# Patient Record
Sex: FEMALE | Race: ASIAN | NOT HISPANIC OR LATINO
[De-identification: names, ages, dates, MRNs, and addresses within clinical notes are randomized per-mention and may not be internally consistent; named-entity substitution may affect disease eponyms.]

---
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
## 2024-04-25 ENCOUNTER — APPOINTMENT (OUTPATIENT)
Dept: OTOLARYNGOLOGY | Facility: CLINIC | Age: 24
End: 2024-04-25

## 2024-06-06 ENCOUNTER — APPOINTMENT (OUTPATIENT)
Dept: OTOLARYNGOLOGY | Facility: CLINIC | Age: 24
End: 2024-06-06
Payer: COMMERCIAL

## 2024-06-06 ENCOUNTER — NON-APPOINTMENT (OUTPATIENT)
Age: 24
End: 2024-06-06

## 2024-06-06 ENCOUNTER — TRANSCRIPTION ENCOUNTER (OUTPATIENT)
Age: 24
End: 2024-06-06

## 2024-06-06 VITALS
WEIGHT: 150 LBS | SYSTOLIC BLOOD PRESSURE: 106 MMHG | TEMPERATURE: 98 F | DIASTOLIC BLOOD PRESSURE: 64 MMHG | BODY MASS INDEX: 22.73 KG/M2 | HEART RATE: 83 BPM | HEIGHT: 68 IN

## 2024-06-06 DIAGNOSIS — R59.0 LOCALIZED ENLARGED LYMPH NODES: ICD-10-CM

## 2024-06-06 PROCEDURE — 99204 OFFICE O/P NEW MOD 45 MIN: CPT

## 2024-06-06 NOTE — PHYSICAL EXAM
[Normal] : mucosa is normal [Midline] : trachea located in midline position [de-identified] : left level III bulky mobile nodes, no palpable other masses

## 2024-06-06 NOTE — DATA REVIEWED
[de-identified] : CBC with mildly elevated WBC in April with neutrophil elevation as well and low lymphs.

## 2024-06-06 NOTE — CONSULT LETTER
[Consult Letter:] : I had the pleasure of evaluating your patient, [unfilled]. [Please see my note below.] : Please see my note below. [Consult Closing:] : Thank you very much for allowing me to participate in the care of this patient.  If you have any questions, please do not hesitate to contact me. [Sincerely,] : Sincerely, [DrCarrillo  ___] : Dr. KOCH [Dear  ___] : Dear ~RANDALL, [FreeTextEntry2] : Xavier Hoang PA-C [facility] [FreeTextEntry3] : Debbie Joyner MD Otolaryngology and Cranial Base Surgery  Attending Physician- Department of Otolaryngology and Head & Neck Surgery  [facility] -[employer] at [facility] Office: [phone]  Fax: [phone]

## 2024-06-06 NOTE — END OF VISIT
[FreeTextEntry3] : I personally saw and examined Ms. NEVAEH BOB in detail this visit today. I personally reviewed the HPI, PMH, FH, SH, ROS and medications/allergies. I have spoken to YUMIKO Avendaño regarding the history and have personally determined the assessment and plan of care, and documented this myself. I was present and participated in all key portions of the encounter and all procedures noted above. I have made changes in the body of the note where appropriate.   Attesting Faculty: See Attending Signature Below

## 2024-06-06 NOTE — HISTORY OF PRESENT ILLNESS
[de-identified] : 23y/o female who came in complaining of enlarged lymph nodes on her neck and now has a large mass on the left side of her neck that started about 3 months ago. She had a US neck showing enlarged lymph nodes 3.3cm on the left. She had a CXR that was clear. She had blood work done which didn't show any significant findings. She has + NIGHT SWEATS. No fever or chills or weight loss. In April she tried a course of keflex for a week but did not get better. She has not been out of the country recently or been exposed to cats.

## 2024-06-07 ENCOUNTER — NON-APPOINTMENT (OUTPATIENT)
Age: 24
End: 2024-06-07

## 2024-06-07 LAB
ACE BLD-CCNC: 22 U/L
ALBUMIN SERPL ELPH-MCNC: 4.7 G/DL
ALP BLD-CCNC: 53 U/L
ALT SERPL-CCNC: 10 U/L
ANION GAP SERPL CALC-SCNC: 12 MMOL/L
APTT BLD: 35 SEC
AST SERPL-CCNC: 16 U/L
BASOPHILS # BLD AUTO: 0.03 K/UL
BASOPHILS NFR BLD AUTO: 0.6 %
BILIRUB SERPL-MCNC: 1 MG/DL
BUN SERPL-MCNC: 11 MG/DL
CALCIUM SERPL-MCNC: 9.8 MG/DL
CHLORIDE SERPL-SCNC: 104 MMOL/L
CO2 SERPL-SCNC: 25 MMOL/L
CREAT SERPL-MCNC: 0.95 MG/DL
CRP SERPL-MCNC: <3 MG/L
EBV EA AB SER IA-ACNC: <5 U/ML
EBV EA AB TITR SER IF: POSITIVE
EBV EA IGG SER QL IA: 578 U/ML
EBV EA IGG SER-ACNC: NEGATIVE
EBV EA IGM SER IA-ACNC: NEGATIVE
EBV PATRN SPEC IB-IMP: NORMAL
EBV VCA IGG SER IA-ACNC: 133 U/ML
EBV VCA IGM SER QL IA: <10 U/ML
EGFR: 87 ML/MIN/1.73M2
EOSINOPHIL # BLD AUTO: 0.07 K/UL
EOSINOPHIL NFR BLD AUTO: 1.5 %
EPSTEIN-BARR VIRUS CAPSID ANTIGEN IGG: POSITIVE
ERYTHROCYTE [SEDIMENTATION RATE] IN BLOOD BY WESTERGREN METHOD: 7 MM/HR
GLUCOSE SERPL-MCNC: 76 MG/DL
HCT VFR BLD CALC: 38.1 %
HGB BLD-MCNC: 12.5 G/DL
HIV1+2 AB SPEC QL IA.RAPID: NONREACTIVE
IMM GRANULOCYTES NFR BLD AUTO: 0.2 %
INR PPP: 1.04 RATIO
LDH SERPL-CCNC: 161 U/L
LYMPHOCYTES # BLD AUTO: 1.64 K/UL
LYMPHOCYTES NFR BLD AUTO: 35.4 %
MAN DIFF?: NORMAL
MCHC RBC-ENTMCNC: 29.1 PG
MCHC RBC-ENTMCNC: 32.8 GM/DL
MCV RBC AUTO: 88.6 FL
MONOCYTES # BLD AUTO: 0.55 K/UL
MONOCYTES NFR BLD AUTO: 11.9 %
NEUTROPHILS # BLD AUTO: 2.33 K/UL
NEUTROPHILS NFR BLD AUTO: 50.4 %
PLATELET # BLD AUTO: 248 K/UL
POTASSIUM SERPL-SCNC: 4.5 MMOL/L
PROT SERPL-MCNC: 7.8 G/DL
PT BLD: 11.7 SEC
RBC # BLD: 4.3 M/UL
RBC # FLD: 13.8 %
SODIUM SERPL-SCNC: 141 MMOL/L
T PALLIDUM AB SER QL IA: NEGATIVE
URATE SERPL-MCNC: 4.7 MG/DL
WBC # FLD AUTO: 4.63 K/UL

## 2024-06-10 ENCOUNTER — NON-APPOINTMENT (OUTPATIENT)
Age: 24
End: 2024-06-10

## 2024-06-10 LAB
ANA SER IF-ACNC: NEGATIVE
M TB IFN-G BLD-IMP: NEGATIVE
QUANTIFERON TB PLUS MITOGEN MINUS NIL: >10 IU/ML
QUANTIFERON TB PLUS NIL: 0.12 IU/ML
QUANTIFERON TB PLUS TB1 MINUS NIL: 0.01 IU/ML
QUANTIFERON TB PLUS TB2 MINUS NIL: 0.02 IU/ML

## 2024-06-11 ENCOUNTER — RESULT REVIEW (OUTPATIENT)
Age: 24
End: 2024-06-11

## 2024-06-18 ENCOUNTER — APPOINTMENT (OUTPATIENT)
Dept: ULTRASOUND IMAGING | Facility: IMAGING CENTER | Age: 24
End: 2024-06-18
Payer: COMMERCIAL

## 2024-06-18 ENCOUNTER — OUTPATIENT (OUTPATIENT)
Dept: OUTPATIENT SERVICES | Facility: HOSPITAL | Age: 24
LOS: 1 days | End: 2024-06-18
Payer: COMMERCIAL

## 2024-06-18 ENCOUNTER — RESULT REVIEW (OUTPATIENT)
Age: 24
End: 2024-06-18

## 2024-06-18 DIAGNOSIS — R59.0 LOCALIZED ENLARGED LYMPH NODES: ICD-10-CM

## 2024-06-18 PROCEDURE — 88172 CYTP DX EVAL FNA 1ST EA SITE: CPT

## 2024-06-18 PROCEDURE — 76942 ECHO GUIDE FOR BIOPSY: CPT

## 2024-06-18 PROCEDURE — 88173 CYTOPATH EVAL FNA REPORT: CPT | Mod: 26

## 2024-06-18 PROCEDURE — 88365 INSITU HYBRIDIZATION (FISH): CPT | Mod: 26

## 2024-06-18 PROCEDURE — 88312 SPECIAL STAINS GROUP 1: CPT

## 2024-06-18 PROCEDURE — 88189 FLOWCYTOMETRY/READ 16 & >: CPT

## 2024-06-18 PROCEDURE — 88305 TISSUE EXAM BY PATHOLOGIST: CPT | Mod: 26

## 2024-06-18 PROCEDURE — 88365 INSITU HYBRIDIZATION (FISH): CPT

## 2024-06-18 PROCEDURE — 88185 FLOWCYTOMETRY/TC ADD-ON: CPT

## 2024-06-18 PROCEDURE — 88342 IMHCHEM/IMCYTCHM 1ST ANTB: CPT | Mod: 26,59

## 2024-06-18 PROCEDURE — 38505 NEEDLE BIOPSY LYMPH NODES: CPT | Mod: LT

## 2024-06-18 PROCEDURE — 88312 SPECIAL STAINS GROUP 1: CPT | Mod: 26

## 2024-06-18 PROCEDURE — 88341 IMHCHEM/IMCYTCHM EA ADD ANTB: CPT

## 2024-06-18 PROCEDURE — 38505 NEEDLE BIOPSY LYMPH NODES: CPT

## 2024-06-18 PROCEDURE — 87205 SMEAR GRAM STAIN: CPT

## 2024-06-18 PROCEDURE — 88184 FLOWCYTOMETRY/ TC 1 MARKER: CPT

## 2024-06-18 PROCEDURE — 88305 TISSUE EXAM BY PATHOLOGIST: CPT

## 2024-06-18 PROCEDURE — 88342 IMHCHEM/IMCYTCHM 1ST ANTB: CPT

## 2024-06-18 PROCEDURE — 88173 CYTOPATH EVAL FNA REPORT: CPT

## 2024-06-18 PROCEDURE — 88360 TUMOR IMMUNOHISTOCHEM/MANUAL: CPT

## 2024-06-18 PROCEDURE — 76942 ECHO GUIDE FOR BIOPSY: CPT | Mod: 26

## 2024-06-18 PROCEDURE — 88341 IMHCHEM/IMCYTCHM EA ADD ANTB: CPT | Mod: 26,59

## 2024-07-12 ENCOUNTER — APPOINTMENT (OUTPATIENT)
Dept: RHEUMATOLOGY | Facility: CLINIC | Age: 24
End: 2024-07-12
Payer: COMMERCIAL

## 2024-07-12 VITALS
HEART RATE: 81 BPM | WEIGHT: 150 LBS | HEIGHT: 68 IN | DIASTOLIC BLOOD PRESSURE: 70 MMHG | OXYGEN SATURATION: 98 % | SYSTOLIC BLOOD PRESSURE: 101 MMHG | BODY MASS INDEX: 22.73 KG/M2

## 2024-07-12 DIAGNOSIS — R59.0 LOCALIZED ENLARGED LYMPH NODES: ICD-10-CM

## 2024-07-12 PROCEDURE — 99204 OFFICE O/P NEW MOD 45 MIN: CPT

## 2024-07-15 ENCOUNTER — NON-APPOINTMENT (OUTPATIENT)
Age: 24
End: 2024-07-15

## 2024-07-15 LAB
ALP BLD-CCNC: 56 U/L
ALT SERPL-CCNC: 15 U/L
ANION GAP SERPL CALC-SCNC: 11 MMOL/L
APPEARANCE: CLEAR
AST SERPL-CCNC: 28 U/L
BACTERIA: NEGATIVE /HPF
BASOPHILS # BLD AUTO: 0.04 K/UL
BASOPHILS NFR BLD AUTO: 0.7 %
BILIRUB SERPL-MCNC: 0.8 MG/DL
BILIRUBIN URINE: NEGATIVE
BLOOD URINE: NEGATIVE
BUN SERPL-MCNC: 10 MG/DL
C3 SERPL-MCNC: 132 MG/DL
C4 SERPL-MCNC: 29 MG/DL
CAST: 0 /LPF
CD16+CD56+ CELLS # BLD: 138 CELLS/UL
CD19 CELLS NFR BLD: 258 CELLS/UL
CD3 CELLS NFR BLD: 76 %
CD3+CD4+ CELLS # BLD: 640 CELLS/UL
CD3+CD4+ CELLS NFR BLD: 37 %
CD3+CD4+ CELLS/CD3+CD8+ CLL SPEC: 1.19 RATIO
CD3+CD8+ CELLS # SPEC: 537 CELLS/UL
CD3+CD8+ CELLS NFR BLD: 31 %
CELLS.CD3-CD19+/CELLS IN BLOOD: 14 %
CHLORIDE SERPL-SCNC: 103 MMOL/L
CO2 SERPL-SCNC: 25 MMOL/L
COLOR: YELLOW
CREAT SERPL-MCNC: 0.9 MG/DL
CREAT SPEC-SCNC: 50 MG/DL
CREAT/PROT UR: 0.1 RATIO
EGFR: 93 ML/MIN/1.73M2
ENA SS-A AB SER IA-ACNC: <0.2 AL
ENA SS-B AB SER IA-ACNC: <0.2 AL
EOSINOPHIL # BLD AUTO: 0.11 K/UL
EOSINOPHIL NFR BLD AUTO: 1.8 %
EPITHELIAL CELLS: 4 /HPF
GLUCOSE QUALITATIVE U: NEGATIVE MG/DL
GLUCOSE SERPL-MCNC: 84 MG/DL
IGA SER QL IEP: 380 MG/DL
IGG SER QL IEP: 1472 MG/DL
IGM SER QL IEP: 100 MG/DL
IMM GRANULOCYTES NFR BLD AUTO: 0.2 %
KAPPA LC CSF-MCNC: 1.52 MG/DL
KAPPA LC SERPL-MCNC: 1.97 MG/DL
KETONES URINE: NEGATIVE MG/DL
LEUKOCYTE ESTERASE URINE: ABNORMAL
LYMPHOCYTES # BLD AUTO: 1.72 K/UL
LYMPHOCYTES NFR BLD AUTO: 28.3 %
MAN DIFF?: NORMAL
MCHC RBC-ENTMCNC: 29.5 PG
MCHC RBC-ENTMCNC: 31.5 GM/DL
MCV RBC AUTO: 93.9 FL
MICROSCOPIC-UA: NORMAL
MONOCYTES # BLD AUTO: 0.56 K/UL
MONOCYTES NFR BLD AUTO: 9.2 %
NEUTROPHILS # BLD AUTO: 3.64 K/UL
NITRITE URINE: NEGATIVE
PH URINE: 6.5
PLATELET # BLD AUTO: 277 K/UL
POTASSIUM SERPL-SCNC: 4.9 MMOL/L
PROT SERPL-MCNC: 8.3 G/DL
PROT UR-MCNC: 4 MG/DL
PROTEIN URINE: NEGATIVE MG/DL
RBC # BLD: 4.57 M/UL
RBC # FLD: 13.8 %
RED BLOOD CELLS URINE: 0 /HPF
REVIEW: NORMAL
SODIUM SERPL-SCNC: 138 MMOL/L
SPECIFIC GRAVITY URINE: 1.01
UROBILINOGEN URINE: 0.2 MG/DL
WBC # FLD AUTO: 6.08 K/UL
WHITE BLOOD CELLS URINE: 1 /HPF

## 2024-07-17 LAB — M PROTEIN SPEC IFE-MCNC: NORMAL

## 2024-07-22 ENCOUNTER — APPOINTMENT (OUTPATIENT)
Dept: CT IMAGING | Facility: CLINIC | Age: 24
End: 2024-07-22
Payer: COMMERCIAL

## 2024-07-22 PROCEDURE — 74177 CT ABD & PELVIS W/CONTRAST: CPT | Mod: 26

## 2024-07-22 PROCEDURE — 71260 CT THORAX DX C+: CPT | Mod: 26

## 2024-08-09 ENCOUNTER — OUTPATIENT (OUTPATIENT)
Dept: OUTPATIENT SERVICES | Facility: HOSPITAL | Age: 24
LOS: 1 days | Discharge: HOME | End: 2024-08-09

## 2024-08-09 DIAGNOSIS — R59.9 ENLARGED LYMPH NODES, UNSPECIFIED: ICD-10-CM

## 2024-08-15 ENCOUNTER — APPOINTMENT (OUTPATIENT)
Dept: HEMATOLOGY ONCOLOGY | Facility: CLINIC | Age: 24
End: 2024-08-15

## 2024-08-15 ENCOUNTER — APPOINTMENT (OUTPATIENT)
Dept: HEMATOLOGY ONCOLOGY | Facility: CLINIC | Age: 24
End: 2024-08-15
Payer: COMMERCIAL

## 2024-08-15 ENCOUNTER — RESULT REVIEW (OUTPATIENT)
Age: 24
End: 2024-08-15

## 2024-08-15 VITALS
TEMPERATURE: 98.1 F | BODY MASS INDEX: 23.12 KG/M2 | HEIGHT: 68.78 IN | SYSTOLIC BLOOD PRESSURE: 110 MMHG | WEIGHT: 156.07 LBS | HEART RATE: 69 BPM | DIASTOLIC BLOOD PRESSURE: 75 MMHG | RESPIRATION RATE: 16 BRPM | OXYGEN SATURATION: 97 %

## 2024-08-15 DIAGNOSIS — R59.0 LOCALIZED ENLARGED LYMPH NODES: ICD-10-CM

## 2024-08-15 DIAGNOSIS — Z82.49 FAMILY HISTORY OF ISCHEMIC HEART DISEASE AND OTHER DISEASES OF THE CIRCULATORY SYSTEM: ICD-10-CM

## 2024-08-15 DIAGNOSIS — Z83.3 FAMILY HISTORY OF DIABETES MELLITUS: ICD-10-CM

## 2024-08-15 LAB
BASOPHILS # BLD AUTO: 0.04 K/UL — SIGNIFICANT CHANGE UP (ref 0–0.2)
BASOPHILS NFR BLD AUTO: 0.7 % — SIGNIFICANT CHANGE UP (ref 0–2)
EOSINOPHIL # BLD AUTO: 0.05 K/UL — SIGNIFICANT CHANGE UP (ref 0–0.5)
EOSINOPHIL NFR BLD AUTO: 0.9 % — SIGNIFICANT CHANGE UP (ref 0–6)
HCT VFR BLD CALC: 38.1 % — SIGNIFICANT CHANGE UP (ref 34.5–45)
HGB BLD-MCNC: 12.9 G/DL — SIGNIFICANT CHANGE UP (ref 11.5–15.5)
IMM GRANULOCYTES NFR BLD AUTO: 0.2 % — SIGNIFICANT CHANGE UP (ref 0–0.9)
LYMPHOCYTES # BLD AUTO: 1.99 K/UL — SIGNIFICANT CHANGE UP (ref 1–3.3)
LYMPHOCYTES # BLD AUTO: 35.8 % — SIGNIFICANT CHANGE UP (ref 13–44)
MCHC RBC-ENTMCNC: 29.8 PG — SIGNIFICANT CHANGE UP (ref 27–34)
MCHC RBC-ENTMCNC: 33.9 G/DL — SIGNIFICANT CHANGE UP (ref 32–36)
MCV RBC AUTO: 88 FL — SIGNIFICANT CHANGE UP (ref 80–100)
MONOCYTES # BLD AUTO: 0.58 K/UL — SIGNIFICANT CHANGE UP (ref 0–0.9)
MONOCYTES NFR BLD AUTO: 10.4 % — SIGNIFICANT CHANGE UP (ref 2–14)
NEUTROPHILS # BLD AUTO: 2.89 K/UL — SIGNIFICANT CHANGE UP (ref 1.8–7.4)
NEUTROPHILS NFR BLD AUTO: 52 % — SIGNIFICANT CHANGE UP (ref 43–77)
NRBC # BLD: 0 /100 WBCS — SIGNIFICANT CHANGE UP (ref 0–0)
NRBC BLD-RTO: 0 /100 WBCS — SIGNIFICANT CHANGE UP (ref 0–0)
PLATELET # BLD AUTO: 227 K/UL — SIGNIFICANT CHANGE UP (ref 150–400)
RBC # BLD: 4.33 M/UL — SIGNIFICANT CHANGE UP (ref 3.8–5.2)
RBC # FLD: 12.9 % — SIGNIFICANT CHANGE UP (ref 10.3–14.5)
WBC # BLD: 5.56 K/UL — SIGNIFICANT CHANGE UP (ref 3.8–10.5)
WBC # FLD AUTO: 5.56 K/UL — SIGNIFICANT CHANGE UP (ref 3.8–10.5)

## 2024-08-15 PROCEDURE — 99204 OFFICE O/P NEW MOD 45 MIN: CPT

## 2024-08-16 LAB
ALBUMIN SERPL ELPH-MCNC: 4.5 G/DL
ALP BLD-CCNC: 59 U/L
ALT SERPL-CCNC: 9 U/L
ANION GAP SERPL CALC-SCNC: 14 MMOL/L
AST SERPL-CCNC: 15 U/L
B2 MICROGLOB SERPL-MCNC: 1.9 MG/L
BILIRUB SERPL-MCNC: 0.7 MG/DL
BUN SERPL-MCNC: 9 MG/DL
CALCIUM SERPL-MCNC: 9.4 MG/DL
CHLORIDE SERPL-SCNC: 100 MMOL/L
CO2 SERPL-SCNC: 23 MMOL/L
CREAT SERPL-MCNC: 0.79 MG/DL
EGFR: 108 ML/MIN/1.73M2
GLUCOSE SERPL-MCNC: 82 MG/DL
LDH SERPL-CCNC: 189 U/L
POTASSIUM SERPL-SCNC: 4.4 MMOL/L
PROT SERPL-MCNC: 7.4 G/DL
SODIUM SERPL-SCNC: 137 MMOL/L

## 2024-08-16 NOTE — RESULTS/DATA
[FreeTextEntry1] : US neck 6/18/24 Initial neck ultrasound demonstrates 2.6 x 0.5 cm prominent left posterior cervical lymph node. The lymph node demonstrates prominent cortex. This lymph node was selected for biopsy. Specimen sent for cytology, histology and flow cytometry evaluation at [facility] integrated pathology services.  Patient:     NEVAEH BOB Accession:                             [id number] Collected Date/Time:                   6/18/2024 19:28 EDT Received Date/Time:                    6/18/2024 19:28 EDT Fine Needle Aspiration Addendum Report - Auth (Verified) Addendum An addendum is issued to report additional stains Additional stains TOOTIE, AFB, GMS are performed on block 1B TOOTIE is negative. AFB- Negative for acid fact bacilli GMS- negative for fungal organisms Diagnosis remains unchanged  Verified by: Susmitha Edappallath, MD (Electronic Signature) Reported on: 07/23/24 13:41 EDT, [facility] _________________________________________________________________  Fine Needle Aspiration Report - Auth (Verified) Specimen(s) Submitted LYMPH NODE, CERVICAL, LEVEL 5, LEFT, US GUIDED CORE BIOPSY AND FNA  Final Diagnosis LYMPH NODE, CERVICAL, LEVEL 5, LEFT, US GUIDED CORE BIOPSY AND FNA NEGATIVE FOR CARCINOMA. Paracortical hyperplasia with patchy necrosis See note:  Note: Current biopsy of the left level 5 cervical lymph node shows paracortical hyperplasia with increased histiocytes and  patchy necrosis. Differential diagnosis include Kikuchi's lymphadenitis, lupus lymphadenitis among others. Suggest clinical correlation and follow up. If lymphadenopathy persists, excision biopsy is recommend with flow cytometry studies.  Microscopy: The cytology slides, cell block show a polymorphous population of lymphocytes, macrophages and few plasma cells.  Core biopsy sections  show expanded paracortex with patchy areas with increased histiocytes, some with crescentic nuclei, aggregates of plasmacytoid cells, few immunoblasts, few plasma cells and numerous karryorhectic bodies and patchy necrosis  Immunohistochemical  stains  for CD2, CD3, CD4, CD5, CD7, CD8, CD20, PAX5, CD10, BCL6, BCL2, CyclinD1, Ki67, CD23, CD21, CD43, CD79a, MUM1, MNDA, LEF1,  myeloperoxidase , , S100, CD1a, TOOTIE stains performed on formalin fixed paraffin embedded tissue, block 1B.  CD2, CD3, CD5, CD43, CD4, CD8 highlight T-cells and expanded paracortex. CD8 appears to be more than CD4. Myeloperoxidase shows some background staining and appear to stain few histiocytes.  highlight few clusters of plasmacytoid dendritic cells. S100 stains few dendritic cells. CD1a is negative.  Ki-67 demonstrates a proliferative rate of around 10-20%  CD20, PAX5, CD79a highlight the B-cells. CD3, CD5, CD43, LEF1 highlight T-cells. CD10, BCL6 is essentially negative. BCL2 highlight B and T-cells. Cyclin D1 stains few histiocytes. CD21/CD23 highlight few disrupted follicular dendritic meshwork.  Special stains AFB, GMS are pending and will be reported as an addendum  Flow  Cytometry ([id number]): - The lymphocyte  immunophenotypic  findings show no diagnostic abnormalities  Screened by: [facility] CT(ASCP) Verified by: Susmitha Edappallath, MD ______________________________________________________________________________________________  PROCEDURE - 7/22/24 CT of the Chest, Abdomen and Pelvis was performed. Sagittal and coronal reformats were performed.  FINDINGS: CHEST: LUNGS AND LARGE AIRWAYS: Patent central airways. No pulmonary nodules. PLEURA: No pleural effusion. VESSELS: Within normal limits. HEART: Heart size is normal. No pericardial effusion. MEDIASTINUM AND NICK: No lymphadenopathy. CHEST WALL AND LOWER NECK: Within normal limits.  ABDOMEN AND PELVIS: LIVER: Within normal limits. BILE DUCTS: Normal caliber. GALLBLADDER: Within normal limits. SPLEEN: Within normal limits. PANCREAS: Within normal limits. ADRENALS: Within normal limits. KIDNEYS/URETERS: Within normal limits.  BLADDER: Within normal limits. REPRODUCTIVE ORGANS: 2.9 cm hemorrhagic cyst in the right adnexa.  BOWEL: No bowel obstruction. Appendix is not visualized. No evidence of inflammation in the pericecal region. PERITONEUM/RETROPERITONEUM: Within normal limits. VESSELS: Within normal limits. LYMPH NODES: No lymphadenopathy. ABDOMINAL WALL: Within normal limits. BONES: No suspicious lytic or blastic lesion. L5 superior endplate Schmorl's node.  IMPRESSION:  No evidence of thoracic, abdominal or pelvic lymphadenopathy.

## 2024-08-16 NOTE — RESULTS/DATA
[FreeTextEntry1] : US neck 6/18/24 Initial neck ultrasound demonstrates 2.6 x 0.5 cm prominent left posterior cervical lymph node. The lymph node demonstrates prominent cortex. This lymph node was selected for biopsy. Specimen sent for cytology, histology and flow cytometry evaluation at [facility] integrated pathology services.  Patient:     NEVAEH BOB Accession:                             [id number] Collected Date/Time:                   6/18/2024 19:28 EDT Received Date/Time:                    6/18/2024 19:28 EDT Fine Needle Aspiration Addendum Report - Auth (Verified) Addendum An addendum is issued to report additional stains Additional stains TOOTIE, AFB, GMS are performed on block 1B TOOTIE is negative. AFB- Negative for acid fact bacilli GMS- negative for fungal organisms Diagnosis remains unchanged  Verified by: Susmitha Edappallath, MD (Electronic Signature) Reported on: 07/23/24 13:41 EDT, [facility] _________________________________________________________________  Fine Needle Aspiration Report - Auth (Verified) Specimen(s) Submitted LYMPH NODE, CERVICAL, LEVEL 5, LEFT, US GUIDED CORE BIOPSY AND FNA  Final Diagnosis LYMPH NODE, CERVICAL, LEVEL 5, LEFT, US GUIDED CORE BIOPSY AND FNA NEGATIVE FOR CARCINOMA. Paracortical hyperplasia with patchy necrosis See note:  Note: Current biopsy of the left level 5 cervical lymph node shows paracortical hyperplasia with increased histiocytes and  patchy necrosis. Differential diagnosis include Kikuchi's lymphadenitis, lupus lymphadenitis among others. Suggest clinical correlation and follow up. If lymphadenopathy persists, excision biopsy is recommend with flow cytometry studies.  Microscopy: The cytology slides, cell block show a polymorphous population of lymphocytes, macrophages and few plasma cells.  Core biopsy sections  show expanded paracortex with patchy areas with increased histiocytes, some with crescentic nuclei, aggregates of plasmacytoid cells, few immunoblasts, few plasma cells and numerous karryorhectic bodies and patchy necrosis  Immunohistochemical  stains  for CD2, CD3, CD4, CD5, CD7, CD8, CD20, PAX5, CD10, BCL6, BCL2, CyclinD1, Ki67, CD23, CD21, CD43, CD79a, MUM1, MNDA, LEF1,  myeloperoxidase , , S100, CD1a, TOOTIE stains performed on formalin fixed paraffin embedded tissue, block 1B.  CD2, CD3, CD5, CD43, CD4, CD8 highlight T-cells and expanded paracortex. CD8 appears to be more than CD4. Myeloperoxidase shows some background staining and appear to stain few histiocytes.  highlight few clusters of plasmacytoid dendritic cells. S100 stains few dendritic cells. CD1a is negative.  Ki-67 demonstrates a proliferative rate of around 10-20%  CD20, PAX5, CD79a highlight the B-cells. CD3, CD5, CD43, LEF1 highlight T-cells. CD10, BCL6 is essentially negative. BCL2 highlight B and T-cells. Cyclin D1 stains few histiocytes. CD21/CD23 highlight few disrupted follicular dendritic meshwork.  Special stains AFB, GMS are pending and will be reported as an addendum  Flow  Cytometry ([id number]): - The lymphocyte  immunophenotypic  findings show no diagnostic abnormalities  Screened by: [facility] CT(ASCP) Verified by: Susmitha Edappallath, MD ______________________________________________________________________________________________  PROCEDURE - 7/22/24 CT of the Chest, Abdomen and Pelvis was performed. Sagittal and coronal reformats were performed.  FINDINGS: CHEST: LUNGS AND LARGE AIRWAYS: Patent central airways. No pulmonary nodules. PLEURA: No pleural effusion. VESSELS: Within normal limits. HEART: Heart size is normal. No pericardial effusion. MEDIASTINUM AND NICK: No lymphadenopathy. CHEST WALL AND LOWER NECK: Within normal limits.  ABDOMEN AND PELVIS: LIVER: Within normal limits. BILE DUCTS: Normal caliber. GALLBLADDER: Within normal limits. SPLEEN: Within normal limits. PANCREAS: Within normal limits. ADRENALS: Within normal limits. KIDNEYS/URETERS: Within normal limits.  BLADDER: Within normal limits. REPRODUCTIVE ORGANS: 2.9 cm hemorrhagic cyst in the right adnexa.  BOWEL: No bowel obstruction. Appendix is not visualized. No evidence of inflammation in the pericecal region. PERITONEUM/RETROPERITONEUM: Within normal limits. VESSELS: Within normal limits. LYMPH NODES: No lymphadenopathy. ABDOMINAL WALL: Within normal limits. BONES: No suspicious lytic or blastic lesion. L5 superior endplate Schmorl's node.  IMPRESSION:  No evidence of thoracic, abdominal or pelvic lymphadenopathy.

## 2024-08-16 NOTE — PHYSICAL EXAM
[Fully active, able to carry on all pre-disease performance without restriction] : Status 0 - Fully active, able to carry on all pre-disease performance without restriction [Normal] : affect appropriate [de-identified] : normal conjunctive, anicteric [de-identified] : small 0.5cm nodule L anterior neck, just lateral to midline [de-identified] : L cervical lymph node ~2-3cm, nontender, soft

## 2024-08-16 NOTE — PHYSICAL EXAM
[Fully active, able to carry on all pre-disease performance without restriction] : Status 0 - Fully active, able to carry on all pre-disease performance without restriction [Normal] : affect appropriate [de-identified] : normal conjunctive, anicteric [de-identified] : small 0.5cm nodule L anterior neck, just lateral to midline [de-identified] : L cervical lymph node ~2-3cm, nontender, soft

## 2024-08-16 NOTE — RESULTS/DATA
[FreeTextEntry1] : US neck 6/18/24 Initial neck ultrasound demonstrates 2.6 x 0.5 cm prominent left posterior cervical lymph node. The lymph node demonstrates prominent cortex. This lymph node was selected for biopsy. Specimen sent for cytology, histology and flow cytometry evaluation at [facility] integrated pathology services.  Patient:     NEVAEH BOB Accession:                             [id number] Collected Date/Time:                   6/18/2024 19:28 EDT Received Date/Time:                    6/18/2024 19:28 EDT Fine Needle Aspiration Addendum Report - Auth (Verified) Addendum An addendum is issued to report additional stains Additional stains TOOTIE, AFB, GMS are performed on block 1B TOOTIE is negative. AFB- Negative for acid fact bacilli GMS- negative for fungal organisms Diagnosis remains unchanged  Verified by: Susmitha Edappallath, MD (Electronic Signature) Reported on: 07/23/24 13:41 EDT, [facility] _________________________________________________________________  Fine Needle Aspiration Report - Auth (Verified) Specimen(s) Submitted LYMPH NODE, CERVICAL, LEVEL 5, LEFT, US GUIDED CORE BIOPSY AND FNA  Final Diagnosis LYMPH NODE, CERVICAL, LEVEL 5, LEFT, US GUIDED CORE BIOPSY AND FNA NEGATIVE FOR CARCINOMA. Paracortical hyperplasia with patchy necrosis See note:  Note: Current biopsy of the left level 5 cervical lymph node shows paracortical hyperplasia with increased histiocytes and  patchy necrosis. Differential diagnosis include Kikuchi's lymphadenitis, lupus lymphadenitis among others. Suggest clinical correlation and follow up. If lymphadenopathy persists, excision biopsy is recommend with flow cytometry studies.  Microscopy: The cytology slides, cell block show a polymorphous population of lymphocytes, macrophages and few plasma cells.  Core biopsy sections  show expanded paracortex with patchy areas with increased histiocytes, some with crescentic nuclei, aggregates of plasmacytoid cells, few immunoblasts, few plasma cells and numerous karryorhectic bodies and patchy necrosis  Immunohistochemical  stains  for CD2, CD3, CD4, CD5, CD7, CD8, CD20, PAX5, CD10, BCL6, BCL2, CyclinD1, Ki67, CD23, CD21, CD43, CD79a, MUM1, MNDA, LEF1,  myeloperoxidase , , S100, CD1a, TOOTIE stains performed on formalin fixed paraffin embedded tissue, block 1B.  CD2, CD3, CD5, CD43, CD4, CD8 highlight T-cells and expanded paracortex. CD8 appears to be more than CD4. Myeloperoxidase shows some background staining and appear to stain few histiocytes.  highlight few clusters of plasmacytoid dendritic cells. S100 stains few dendritic cells. CD1a is negative.  Ki-67 demonstrates a proliferative rate of around 10-20%  CD20, PAX5, CD79a highlight the B-cells. CD3, CD5, CD43, LEF1 highlight T-cells. CD10, BCL6 is essentially negative. BCL2 highlight B and T-cells. Cyclin D1 stains few histiocytes. CD21/CD23 highlight few disrupted follicular dendritic meshwork.  Special stains AFB, GMS are pending and will be reported as an addendum  Flow  Cytometry ([id number]): - The lymphocyte  immunophenotypic  findings show no diagnostic abnormalities  Screened by: [facility] CT(ASCP) Verified by: Susmitha Edappallath, MD ______________________________________________________________________________________________  PROCEDURE - 7/22/24 CT of the Chest, Abdomen and Pelvis was performed. Sagittal and coronal reformats were performed.  FINDINGS: CHEST: LUNGS AND LARGE AIRWAYS: Patent central airways. No pulmonary nodules. PLEURA: No pleural effusion. VESSELS: Within normal limits. HEART: Heart size is normal. No pericardial effusion. MEDIASTINUM AND NICK: No lymphadenopathy. CHEST WALL AND LOWER NECK: Within normal limits.  ABDOMEN AND PELVIS: LIVER: Within normal limits. BILE DUCTS: Normal caliber. GALLBLADDER: Within normal limits. SPLEEN: Within normal limits. PANCREAS: Within normal limits. ADRENALS: Within normal limits. KIDNEYS/URETERS: Within normal limits.  BLADDER: Within normal limits. REPRODUCTIVE ORGANS: 2.9 cm hemorrhagic cyst in the right adnexa.  BOWEL: No bowel obstruction. Appendix is not visualized. No evidence of inflammation in the pericecal region. PERITONEUM/RETROPERITONEUM: Within normal limits. VESSELS: Within normal limits. LYMPH NODES: No lymphadenopathy. ABDOMINAL WALL: Within normal limits. BONES: No suspicious lytic or blastic lesion. L5 superior endplate Schmorl's node.  IMPRESSION:  No evidence of thoracic, abdominal or pelvic lymphadenopathy.

## 2024-08-16 NOTE — PHYSICAL EXAM
[Fully active, able to carry on all pre-disease performance without restriction] : Status 0 - Fully active, able to carry on all pre-disease performance without restriction [Normal] : affect appropriate [de-identified] : normal conjunctive, anicteric [de-identified] : small 0.5cm nodule L anterior neck, just lateral to midline [de-identified] : L cervical lymph node ~2-3cm, nontender, soft

## 2024-08-16 NOTE — PHYSICAL EXAM
[Fully active, able to carry on all pre-disease performance without restriction] : Status 0 - Fully active, able to carry on all pre-disease performance without restriction [Normal] : affect appropriate [de-identified] : normal conjunctive, anicteric [de-identified] : small 0.5cm nodule L anterior neck, just lateral to midline [de-identified] : L cervical lymph node ~2-3cm, nontender, soft

## 2024-08-16 NOTE — HISTORY OF PRESENT ILLNESS
[de-identified] : 25 yo F with no previous PMH presents for evaluation for cervical lymphadenopathy.  Patient reports that she noticed a few lumps on her left neck in March 2024, which were bead-sized at the time. They were initially painful but then resolved except for a single bump that continued to grow. Due to this, she sought medical attention with her PMD who referred her to ENT. Patient was evaluated by ENT, had an US neck showing enlarged lymph nodes 3.3 cm on the left, a CXR that was clear, and unremarkable blood work. She was referred for a lymph node biopsy and underwent an US-guided core and FNA of her lymph node on 6/18/24. Pathology was negative for carcinoma and showed paracortical hyperplasia with increased histiocytes and  patchy necrosis. Differential diagnosis include Kikuchi's lymphadenitis, lupus lymphadenitis among others. Patient was then referred to Rheumatology and Hematology. She was evaluated by rheumaotology on 7/12/24, had blood work done which was grossly unremarkable, and CT C/A/P which was negative for any thoracic, abdominal, or pelvic lymphadnopathy.  Previous labs reviewed from June 2024 CBC: 4.63 > 12.5 < 248, ANC 2330 CMP: K 4.5, Crt 0.95, Ca 9.8 LDH/UA: 161/4.7 ESR/CRP: 7/< 3 EBV - consistent with past infection ACE - normal Normal coags  Today, 8/15/24, patient reports that her L-sided lymph node has not grown in size for the past month but she did develop a new bump on her anterior neck 1 week prior. The lesion was initially painful, but no longer bothers her. Her L cervical lymph node does cause her discomfort and is intermittently painful. She enodrses increased sweating of her head once a week and chills a few times/week. Otherwise denies fevers, unintentional weight loss, pruritis/rashes, decreased energy, easy bruising/bleeding, CP, SOB, leg swelling, nausea/vomiting, abdominal pain, Appetite is good. Reports she is scheduled for an excisional biopsy on 8/20/24.

## 2024-08-16 NOTE — REVIEW OF SYSTEMS
[Chills] : chills [Negative] : Endocrine [Fever] : no fever [Fatigue] : no fatigue [Recent Change In Weight] : ~T no recent weight change [Easy Bleeding] : no tendency for easy bleeding [Easy Bruising] : no tendency for easy bruising [FreeTextEntry2] : excessive head sweating once per week

## 2024-08-16 NOTE — ASSESSMENT
[FreeTextEntry1] : Well-appearing 23 yo F with cervical lymphadenopathy of unclear etiology here for evaluation due to concern for malignancy.  All labs, radiology imaging and reports, consult notes reviewed in detail prior to this visit. She has had normal lab studies, including a normal CBC, CMP, LDH, and uric acid, CT C/A/P is negative for any other lymphadenopathy, and previous biopsy was negative for malignant cells. It showed increased histiocytes and patchy necrosis, for which differential diagnosis includes Kikuchi's lymphadenitis, lupus lymphadenitis, etc. Given all of this as well as the fact that that her lymphadenopathy is intermittently painful and she with without any B symptoms, have very low suspicion for malignancy.   Plan: - Labs today: repeat CBC, CMP, beta-2 microglobulin, LDH - Patient scheduled for excisional biopsy on 8/20/24, will follow up pathology - If no evidence of malignancy, no need for hematology follow up and recommend follow up with PMD and Rheumatology - All questions/concerns addressed to patient's apparent satisfaction - Plan discussed with patient and she is agreeable  Patient seen and discussed with Dr. Vivar. Eliana Kinsey DO, MPH Medical Oncology Fellow

## 2024-08-16 NOTE — RESULTS/DATA
[FreeTextEntry1] : US neck 6/18/24 Initial neck ultrasound demonstrates 2.6 x 0.5 cm prominent left posterior cervical lymph node. The lymph node demonstrates prominent cortex. This lymph node was selected for biopsy. Specimen sent for cytology, histology and flow cytometry evaluation at [facility] integrated pathology services.  Patient:     NEVAEH BOB Accession:                             [id number] Collected Date/Time:                   6/18/2024 19:28 EDT Received Date/Time:                    6/18/2024 19:28 EDT Fine Needle Aspiration Addendum Report - Auth (Verified) Addendum An addendum is issued to report additional stains Additional stains TOOTIE, AFB, GMS are performed on block 1B TOOTIE is negative. AFB- Negative for acid fact bacilli GMS- negative for fungal organisms Diagnosis remains unchanged  Verified by: Susmitha Edappallath, MD (Electronic Signature) Reported on: 07/23/24 13:41 EDT, [facility] _________________________________________________________________  Fine Needle Aspiration Report - Auth (Verified) Specimen(s) Submitted LYMPH NODE, CERVICAL, LEVEL 5, LEFT, US GUIDED CORE BIOPSY AND FNA  Final Diagnosis LYMPH NODE, CERVICAL, LEVEL 5, LEFT, US GUIDED CORE BIOPSY AND FNA NEGATIVE FOR CARCINOMA. Paracortical hyperplasia with patchy necrosis See note:  Note: Current biopsy of the left level 5 cervical lymph node shows paracortical hyperplasia with increased histiocytes and  patchy necrosis. Differential diagnosis include Kikuchi's lymphadenitis, lupus lymphadenitis among others. Suggest clinical correlation and follow up. If lymphadenopathy persists, excision biopsy is recommend with flow cytometry studies.  Microscopy: The cytology slides, cell block show a polymorphous population of lymphocytes, macrophages and few plasma cells.  Core biopsy sections  show expanded paracortex with patchy areas with increased histiocytes, some with crescentic nuclei, aggregates of plasmacytoid cells, few immunoblasts, few plasma cells and numerous karryorhectic bodies and patchy necrosis  Immunohistochemical  stains  for CD2, CD3, CD4, CD5, CD7, CD8, CD20, PAX5, CD10, BCL6, BCL2, CyclinD1, Ki67, CD23, CD21, CD43, CD79a, MUM1, MNDA, LEF1,  myeloperoxidase , , S100, CD1a, TOOTIE stains performed on formalin fixed paraffin embedded tissue, block 1B.  CD2, CD3, CD5, CD43, CD4, CD8 highlight T-cells and expanded paracortex. CD8 appears to be more than CD4. Myeloperoxidase shows some background staining and appear to stain few histiocytes.  highlight few clusters of plasmacytoid dendritic cells. S100 stains few dendritic cells. CD1a is negative.  Ki-67 demonstrates a proliferative rate of around 10-20%  CD20, PAX5, CD79a highlight the B-cells. CD3, CD5, CD43, LEF1 highlight T-cells. CD10, BCL6 is essentially negative. BCL2 highlight B and T-cells. Cyclin D1 stains few histiocytes. CD21/CD23 highlight few disrupted follicular dendritic meshwork.  Special stains AFB, GMS are pending and will be reported as an addendum  Flow  Cytometry ([id number]): - The lymphocyte  immunophenotypic  findings show no diagnostic abnormalities  Screened by: [facility] CT(ASCP) Verified by: Susmitha Edappallath, MD ______________________________________________________________________________________________  PROCEDURE - 7/22/24 CT of the Chest, Abdomen and Pelvis was performed. Sagittal and coronal reformats were performed.  FINDINGS: CHEST: LUNGS AND LARGE AIRWAYS: Patent central airways. No pulmonary nodules. PLEURA: No pleural effusion. VESSELS: Within normal limits. HEART: Heart size is normal. No pericardial effusion. MEDIASTINUM AND NICK: No lymphadenopathy. CHEST WALL AND LOWER NECK: Within normal limits.  ABDOMEN AND PELVIS: LIVER: Within normal limits. BILE DUCTS: Normal caliber. GALLBLADDER: Within normal limits. SPLEEN: Within normal limits. PANCREAS: Within normal limits. ADRENALS: Within normal limits. KIDNEYS/URETERS: Within normal limits.  BLADDER: Within normal limits. REPRODUCTIVE ORGANS: 2.9 cm hemorrhagic cyst in the right adnexa.  BOWEL: No bowel obstruction. Appendix is not visualized. No evidence of inflammation in the pericecal region. PERITONEUM/RETROPERITONEUM: Within normal limits. VESSELS: Within normal limits. LYMPH NODES: No lymphadenopathy. ABDOMINAL WALL: Within normal limits. BONES: No suspicious lytic or blastic lesion. L5 superior endplate Schmorl's node.  IMPRESSION:  No evidence of thoracic, abdominal or pelvic lymphadenopathy.

## 2024-08-16 NOTE — PHYSICAL EXAM
[Fully active, able to carry on all pre-disease performance without restriction] : Status 0 - Fully active, able to carry on all pre-disease performance without restriction [Normal] : affect appropriate [de-identified] : normal conjunctive, anicteric [de-identified] : small 0.5cm nodule L anterior neck, just lateral to midline [de-identified] : L cervical lymph node ~2-3cm, nontender, soft

## 2024-08-16 NOTE — ASSESSMENT
[FreeTextEntry1] : Well-appearing 25 yo F with cervical lymphadenopathy of unclear etiology here for evaluation due to concern for malignancy.  All labs, radiology imaging and reports, consult notes reviewed in detail prior to this visit. She has had normal lab studies, including a normal CBC, CMP, LDH, and uric acid, CT C/A/P is negative for any other lymphadenopathy, and previous biopsy was negative for malignant cells. It showed increased histiocytes and patchy necrosis, for which differential diagnosis includes Kikuchi's lymphadenitis, lupus lymphadenitis, etc. Given all of this as well as the fact that that her lymphadenopathy is intermittently painful and she with without any B symptoms, have very low suspicion for malignancy.   Plan: - Labs today: repeat CBC, CMP, beta-2 microglobulin, LDH - Patient scheduled for excisional biopsy on 8/20/24, will follow up pathology - If no evidence of malignancy, no need for hematology follow up and recommend follow up with PMD and Rheumatology - All questions/concerns addressed to patient's apparent satisfaction - Plan discussed with patient and she is agreeable  Patient seen and discussed with Dr. Vivar. Eliana Kinsey DO, MPH Medical Oncology Fellow

## 2024-08-16 NOTE — HISTORY OF PRESENT ILLNESS
[de-identified] : 23 yo F with no previous PMH presents for evaluation for cervical lymphadenopathy.  Patient reports that she noticed a few lumps on her left neck in March 2024, which were bead-sized at the time. They were initially painful but then resolved except for a single bump that continued to grow. Due to this, she sought medical attention with her PMD who referred her to ENT. Patient was evaluated by ENT, had an US neck showing enlarged lymph nodes 3.3 cm on the left, a CXR that was clear, and unremarkable blood work. She was referred for a lymph node biopsy and underwent an US-guided core and FNA of her lymph node on 6/18/24. Pathology was negative for carcinoma and showed paracortical hyperplasia with increased histiocytes and  patchy necrosis. Differential diagnosis include Kikuchi's lymphadenitis, lupus lymphadenitis among others. Patient was then referred to Rheumatology and Hematology. She was evaluated by rheumaotology on 7/12/24, had blood work done which was grossly unremarkable, and CT C/A/P which was negative for any thoracic, abdominal, or pelvic lymphadnopathy.  Previous labs reviewed from June 2024 CBC: 4.63 > 12.5 < 248, ANC 2330 CMP: K 4.5, Crt 0.95, Ca 9.8 LDH/UA: 161/4.7 ESR/CRP: 7/< 3 EBV - consistent with past infection ACE - normal Normal coags  Today, 8/15/24, patient reports that her L-sided lymph node has not grown in size for the past month but she did develop a new bump on her anterior neck 1 week prior. The lesion was initially painful, but no longer bothers her. Her L cervical lymph node does cause her discomfort and is intermittently painful. She enodrses increased sweating of her head once a week and chills a few times/week. Otherwise denies fevers, unintentional weight loss, pruritis/rashes, decreased energy, easy bruising/bleeding, CP, SOB, leg swelling, nausea/vomiting, abdominal pain, Appetite is good. Reports she is scheduled for an excisional biopsy on 8/20/24.

## 2024-08-16 NOTE — HISTORY OF PRESENT ILLNESS
[de-identified] : 25 yo F with no previous PMH presents for evaluation for cervical lymphadenopathy.  Patient reports that she noticed a few lumps on her left neck in March 2024, which were bead-sized at the time. They were initially painful but then resolved except for a single bump that continued to grow. Due to this, she sought medical attention with her PMD who referred her to ENT. Patient was evaluated by ENT, had an US neck showing enlarged lymph nodes 3.3 cm on the left, a CXR that was clear, and unremarkable blood work. She was referred for a lymph node biopsy and underwent an US-guided core and FNA of her lymph node on 6/18/24. Pathology was negative for carcinoma and showed paracortical hyperplasia with increased histiocytes and  patchy necrosis. Differential diagnosis include Kikuchi's lymphadenitis, lupus lymphadenitis among others. Patient was then referred to Rheumatology and Hematology. She was evaluated by rheumaotology on 7/12/24, had blood work done which was grossly unremarkable, and CT C/A/P which was negative for any thoracic, abdominal, or pelvic lymphadnopathy.  Previous labs reviewed from June 2024 CBC: 4.63 > 12.5 < 248, ANC 2330 CMP: K 4.5, Crt 0.95, Ca 9.8 LDH/UA: 161/4.7 ESR/CRP: 7/< 3 EBV - consistent with past infection ACE - normal Normal coags  Today, 8/15/24, patient reports that her L-sided lymph node has not grown in size for the past month but she did develop a new bump on her anterior neck 1 week prior. The lesion was initially painful, but no longer bothers her. Her L cervical lymph node does cause her discomfort and is intermittently painful. She enodrses increased sweating of her head once a week and chills a few times/week. Otherwise denies fevers, unintentional weight loss, pruritis/rashes, decreased energy, easy bruising/bleeding, CP, SOB, leg swelling, nausea/vomiting, abdominal pain, Appetite is good. Reports she is scheduled for an excisional biopsy on 8/20/24.

## 2024-08-16 NOTE — RESULTS/DATA
[FreeTextEntry1] : US neck 6/18/24 Initial neck ultrasound demonstrates 2.6 x 0.5 cm prominent left posterior cervical lymph node. The lymph node demonstrates prominent cortex. This lymph node was selected for biopsy. Specimen sent for cytology, histology and flow cytometry evaluation at [facility] integrated pathology services.  Patient:     NEVAEH BOB Accession:                             [id number] Collected Date/Time:                   6/18/2024 19:28 EDT Received Date/Time:                    6/18/2024 19:28 EDT Fine Needle Aspiration Addendum Report - Auth (Verified) Addendum An addendum is issued to report additional stains Additional stains TOOTIE, AFB, GMS are performed on block 1B TOOTIE is negative. AFB- Negative for acid fact bacilli GMS- negative for fungal organisms Diagnosis remains unchanged  Verified by: Susmitha Edappallath, MD (Electronic Signature) Reported on: 07/23/24 13:41 EDT, [facility] _________________________________________________________________  Fine Needle Aspiration Report - Auth (Verified) Specimen(s) Submitted LYMPH NODE, CERVICAL, LEVEL 5, LEFT, US GUIDED CORE BIOPSY AND FNA  Final Diagnosis LYMPH NODE, CERVICAL, LEVEL 5, LEFT, US GUIDED CORE BIOPSY AND FNA NEGATIVE FOR CARCINOMA. Paracortical hyperplasia with patchy necrosis See note:  Note: Current biopsy of the left level 5 cervical lymph node shows paracortical hyperplasia with increased histiocytes and  patchy necrosis. Differential diagnosis include Kikuchi's lymphadenitis, lupus lymphadenitis among others. Suggest clinical correlation and follow up. If lymphadenopathy persists, excision biopsy is recommend with flow cytometry studies.  Microscopy: The cytology slides, cell block show a polymorphous population of lymphocytes, macrophages and few plasma cells.  Core biopsy sections  show expanded paracortex with patchy areas with increased histiocytes, some with crescentic nuclei, aggregates of plasmacytoid cells, few immunoblasts, few plasma cells and numerous karryorhectic bodies and patchy necrosis  Immunohistochemical  stains  for CD2, CD3, CD4, CD5, CD7, CD8, CD20, PAX5, CD10, BCL6, BCL2, CyclinD1, Ki67, CD23, CD21, CD43, CD79a, MUM1, MNDA, LEF1,  myeloperoxidase , , S100, CD1a, TOOTIE stains performed on formalin fixed paraffin embedded tissue, block 1B.  CD2, CD3, CD5, CD43, CD4, CD8 highlight T-cells and expanded paracortex. CD8 appears to be more than CD4. Myeloperoxidase shows some background staining and appear to stain few histiocytes.  highlight few clusters of plasmacytoid dendritic cells. S100 stains few dendritic cells. CD1a is negative.  Ki-67 demonstrates a proliferative rate of around 10-20%  CD20, PAX5, CD79a highlight the B-cells. CD3, CD5, CD43, LEF1 highlight T-cells. CD10, BCL6 is essentially negative. BCL2 highlight B and T-cells. Cyclin D1 stains few histiocytes. CD21/CD23 highlight few disrupted follicular dendritic meshwork.  Special stains AFB, GMS are pending and will be reported as an addendum  Flow  Cytometry ([id number]): - The lymphocyte  immunophenotypic  findings show no diagnostic abnormalities  Screened by: [facility] CT(ASCP) Verified by: Susmitha Edappallath, MD ______________________________________________________________________________________________  PROCEDURE - 7/22/24 CT of the Chest, Abdomen and Pelvis was performed. Sagittal and coronal reformats were performed.  FINDINGS: CHEST: LUNGS AND LARGE AIRWAYS: Patent central airways. No pulmonary nodules. PLEURA: No pleural effusion. VESSELS: Within normal limits. HEART: Heart size is normal. No pericardial effusion. MEDIASTINUM AND NICK: No lymphadenopathy. CHEST WALL AND LOWER NECK: Within normal limits.  ABDOMEN AND PELVIS: LIVER: Within normal limits. BILE DUCTS: Normal caliber. GALLBLADDER: Within normal limits. SPLEEN: Within normal limits. PANCREAS: Within normal limits. ADRENALS: Within normal limits. KIDNEYS/URETERS: Within normal limits.  BLADDER: Within normal limits. REPRODUCTIVE ORGANS: 2.9 cm hemorrhagic cyst in the right adnexa.  BOWEL: No bowel obstruction. Appendix is not visualized. No evidence of inflammation in the pericecal region. PERITONEUM/RETROPERITONEUM: Within normal limits. VESSELS: Within normal limits. LYMPH NODES: No lymphadenopathy. ABDOMINAL WALL: Within normal limits. BONES: No suspicious lytic or blastic lesion. L5 superior endplate Schmorl's node.  IMPRESSION:  No evidence of thoracic, abdominal or pelvic lymphadenopathy.

## 2024-08-16 NOTE — HISTORY OF PRESENT ILLNESS
[de-identified] : 23 yo F with no previous PMH presents for evaluation for cervical lymphadenopathy.  Patient reports that she noticed a few lumps on her left neck in March 2024, which were bead-sized at the time. They were initially painful but then resolved except for a single bump that continued to grow. Due to this, she sought medical attention with her PMD who referred her to ENT. Patient was evaluated by ENT, had an US neck showing enlarged lymph nodes 3.3 cm on the left, a CXR that was clear, and unremarkable blood work. She was referred for a lymph node biopsy and underwent an US-guided core and FNA of her lymph node on 6/18/24. Pathology was negative for carcinoma and showed paracortical hyperplasia with increased histiocytes and  patchy necrosis. Differential diagnosis include Kikuchi's lymphadenitis, lupus lymphadenitis among others. Patient was then referred to Rheumatology and Hematology. She was evaluated by rheumaotology on 7/12/24, had blood work done which was grossly unremarkable, and CT C/A/P which was negative for any thoracic, abdominal, or pelvic lymphadnopathy.  Previous labs reviewed from June 2024 CBC: 4.63 > 12.5 < 248, ANC 2330 CMP: K 4.5, Crt 0.95, Ca 9.8 LDH/UA: 161/4.7 ESR/CRP: 7/< 3 EBV - consistent with past infection ACE - normal Normal coags  Today, 8/15/24, patient reports that her L-sided lymph node has not grown in size for the past month but she did develop a new bump on her anterior neck 1 week prior. The lesion was initially painful, but no longer bothers her. Her L cervical lymph node does cause her discomfort and is intermittently painful. She enodrses increased sweating of her head once a week and chills a few times/week. Otherwise denies fevers, unintentional weight loss, pruritis/rashes, decreased energy, easy bruising/bleeding, CP, SOB, leg swelling, nausea/vomiting, abdominal pain, Appetite is good. Reports she is scheduled for an excisional biopsy on 8/20/24.

## 2024-08-16 NOTE — PHYSICAL EXAM
[Fully active, able to carry on all pre-disease performance without restriction] : Status 0 - Fully active, able to carry on all pre-disease performance without restriction [Normal] : affect appropriate [de-identified] : normal conjunctive, anicteric [de-identified] : small 0.5cm nodule L anterior neck, just lateral to midline [de-identified] : L cervical lymph node ~2-3cm, nontender, soft

## 2024-08-16 NOTE — HISTORY OF PRESENT ILLNESS
[de-identified] : 25 yo F with no previous PMH presents for evaluation for cervical lymphadenopathy.  Patient reports that she noticed a few lumps on her left neck in March 2024, which were bead-sized at the time. They were initially painful but then resolved except for a single bump that continued to grow. Due to this, she sought medical attention with her PMD who referred her to ENT. Patient was evaluated by ENT, had an US neck showing enlarged lymph nodes 3.3 cm on the left, a CXR that was clear, and unremarkable blood work. She was referred for a lymph node biopsy and underwent an US-guided core and FNA of her lymph node on 6/18/24. Pathology was negative for carcinoma and showed paracortical hyperplasia with increased histiocytes and  patchy necrosis. Differential diagnosis include Kikuchi's lymphadenitis, lupus lymphadenitis among others. Patient was then referred to Rheumatology and Hematology. She was evaluated by rheumaotology on 7/12/24, had blood work done which was grossly unremarkable, and CT C/A/P which was negative for any thoracic, abdominal, or pelvic lymphadnopathy.  Previous labs reviewed from June 2024 CBC: 4.63 > 12.5 < 248, ANC 2330 CMP: K 4.5, Crt 0.95, Ca 9.8 LDH/UA: 161/4.7 ESR/CRP: 7/< 3 EBV - consistent with past infection ACE - normal Normal coags  Today, 8/15/24, patient reports that her L-sided lymph node has not grown in size for the past month but she did develop a new bump on her anterior neck 1 week prior. The lesion was initially painful, but no longer bothers her. Her L cervical lymph node does cause her discomfort and is intermittently painful. She enodrses increased sweating of her head once a week and chills a few times/week. Otherwise denies fevers, unintentional weight loss, pruritis/rashes, decreased energy, easy bruising/bleeding, CP, SOB, leg swelling, nausea/vomiting, abdominal pain, Appetite is good. Reports she is scheduled for an excisional biopsy on 8/20/24.

## 2024-08-16 NOTE — HISTORY OF PRESENT ILLNESS
[de-identified] : 23 yo F with no previous PMH presents for evaluation for cervical lymphadenopathy.  Patient reports that she noticed a few lumps on her left neck in March 2024, which were bead-sized at the time. They were initially painful but then resolved except for a single bump that continued to grow. Due to this, she sought medical attention with her PMD who referred her to ENT. Patient was evaluated by ENT, had an US neck showing enlarged lymph nodes 3.3 cm on the left, a CXR that was clear, and unremarkable blood work. She was referred for a lymph node biopsy and underwent an US-guided core and FNA of her lymph node on 6/18/24. Pathology was negative for carcinoma and showed paracortical hyperplasia with increased histiocytes and  patchy necrosis. Differential diagnosis include Kikuchi's lymphadenitis, lupus lymphadenitis among others. Patient was then referred to Rheumatology and Hematology. She was evaluated by rheumaotology on 7/12/24, had blood work done which was grossly unremarkable, and CT C/A/P which was negative for any thoracic, abdominal, or pelvic lymphadnopathy.  Previous labs reviewed from June 2024 CBC: 4.63 > 12.5 < 248, ANC 2330 CMP: K 4.5, Crt 0.95, Ca 9.8 LDH/UA: 161/4.7 ESR/CRP: 7/< 3 EBV - consistent with past infection ACE - normal Normal coags  Today, 8/15/24, patient reports that her L-sided lymph node has not grown in size for the past month but she did develop a new bump on her anterior neck 1 week prior. The lesion was initially painful, but no longer bothers her. Her L cervical lymph node does cause her discomfort and is intermittently painful. She enodrses increased sweating of her head once a week and chills a few times/week. Otherwise denies fevers, unintentional weight loss, pruritis/rashes, decreased energy, easy bruising/bleeding, CP, SOB, leg swelling, nausea/vomiting, abdominal pain, Appetite is good. Reports she is scheduled for an excisional biopsy on 8/20/24.

## 2024-08-20 ENCOUNTER — OUTPATIENT (OUTPATIENT)
Dept: OUTPATIENT SERVICES | Facility: HOSPITAL | Age: 24
LOS: 1 days | Discharge: HOME | End: 2024-08-20
Payer: COMMERCIAL

## 2024-08-20 ENCOUNTER — RESULT REVIEW (OUTPATIENT)
Age: 24
End: 2024-08-20

## 2024-08-20 ENCOUNTER — APPOINTMENT (OUTPATIENT)
Dept: OTOLARYNGOLOGY | Facility: AMBULATORY SURGERY CENTER | Age: 24
End: 2024-08-20

## 2024-08-20 ENCOUNTER — TRANSCRIPTION ENCOUNTER (OUTPATIENT)
Age: 24
End: 2024-08-20

## 2024-08-20 VITALS
WEIGHT: 151.9 LBS | HEIGHT: 68 IN | DIASTOLIC BLOOD PRESSURE: 78 MMHG | RESPIRATION RATE: 16 BRPM | TEMPERATURE: 98 F | HEART RATE: 73 BPM | SYSTOLIC BLOOD PRESSURE: 110 MMHG | OXYGEN SATURATION: 99 %

## 2024-08-20 VITALS
RESPIRATION RATE: 17 BRPM | DIASTOLIC BLOOD PRESSURE: 63 MMHG | SYSTOLIC BLOOD PRESSURE: 105 MMHG | TEMPERATURE: 98 F | HEART RATE: 73 BPM | OXYGEN SATURATION: 100 %

## 2024-08-20 DIAGNOSIS — R59.0 LOCALIZED ENLARGED LYMPH NODES: ICD-10-CM

## 2024-08-20 DIAGNOSIS — Z98.890 OTHER SPECIFIED POSTPROCEDURAL STATES: Chronic | ICD-10-CM

## 2024-08-20 PROCEDURE — 88307 TISSUE EXAM BY PATHOLOGIST: CPT | Mod: 26

## 2024-08-20 PROCEDURE — 88365 INSITU HYBRIDIZATION (FISH): CPT | Mod: 26

## 2024-08-20 PROCEDURE — 88342 IMHCHEM/IMCYTCHM 1ST ANTB: CPT | Mod: 26,59

## 2024-08-20 PROCEDURE — 88360 TUMOR IMMUNOHISTOCHEM/MANUAL: CPT | Mod: 26,1L,59

## 2024-08-20 PROCEDURE — 88291 CYTO/MOLECULAR REPORT: CPT | Mod: 1L

## 2024-08-20 PROCEDURE — 88364 INSITU HYBRIDIZATION (FISH): CPT | Mod: 26

## 2024-08-20 PROCEDURE — 38510 BIOPSY/REMOVAL LYMPH NODES: CPT | Mod: LT

## 2024-08-20 PROCEDURE — 88189 FLOWCYTOMETRY/READ 16 & >: CPT

## 2024-08-20 PROCEDURE — 88341 IMHCHEM/IMCYTCHM EA ADD ANTB: CPT | Mod: 26

## 2024-08-20 RX ORDER — OXYCODONE HYDROCHLORIDE 5 MG/1
1 TABLET ORAL
Qty: 6 | Refills: 0
Start: 2024-08-20

## 2024-08-20 NOTE — ASU DISCHARGE PLAN (ADULT/PEDIATRIC) - MEDICATION INSTRUCTIONS
tylenol for pain (1000mg every 6 hours). can add in oxycodone 5mg tablet if needed for more severe pain.

## 2024-08-20 NOTE — ASU PREOPERATIVE ASSESSMENT, ADULT ([place] ONLY) - FALL HARM RISK - UNIVERSAL INTERVENTIONS
Bed in lowest position, wheels locked, appropriate side rails in place/Call bell, personal items and telephone in reach/Instruct patient to call for assistance before getting out of bed or chair/Non-slip footwear when patient is out of bed/[place] to call system/Physically safe environment - no spills, clutter or unnecessary equipment/Purposeful Proactive Rounding/Room/bathroom lighting operational, light cord in reach

## 2024-08-20 NOTE — ASU DISCHARGE PLAN (ADULT/PEDIATRIC) - NS MD DC FALL RISK RISK
For information on Fall & Injury Prevention, visit: [email or web address] OR  [email or web address] OR  https://www.cdc.gov/steadi/patient.html

## 2024-08-20 NOTE — ASU DISCHARGE PLAN (ADULT/PEDIATRIC) - CARE PROVIDER_API CALL
Debbie Joyner  Otolaryngology  [address] [place] [address], [address]  [place] [address]  Phone: [phone]  Fax: [phone]  Established Patient  Follow Up Time: 1 week

## 2024-08-21 LAB — TM INTERPRETATION: SIGNIFICANT CHANGE UP

## 2024-08-26 ENCOUNTER — NON-APPOINTMENT (OUTPATIENT)
Age: 24
End: 2024-08-26

## 2024-08-26 ENCOUNTER — APPOINTMENT (OUTPATIENT)
Dept: OTOLARYNGOLOGY | Facility: CLINIC | Age: 24
End: 2024-08-26
Payer: COMMERCIAL

## 2024-08-26 VITALS
WEIGHT: 155 LBS | HEART RATE: 69 BPM | BODY MASS INDEX: 23.49 KG/M2 | OXYGEN SATURATION: 95 % | SYSTOLIC BLOOD PRESSURE: 107 MMHG | HEIGHT: 68 IN | DIASTOLIC BLOOD PRESSURE: 72 MMHG

## 2024-08-26 DIAGNOSIS — R59.0 LOCALIZED ENLARGED LYMPH NODES: ICD-10-CM

## 2024-08-26 LAB — HEMATOPATHOLOGY REPORT: SIGNIFICANT CHANGE UP

## 2024-08-26 PROCEDURE — 99024 POSTOP FOLLOW-UP VISIT: CPT

## 2024-08-26 NOTE — ASSESSMENT
[FreeTextEntry1] : 23y/o female s/p excisional cervical lymph node biopsy 8/20/24: - CN intact; reassured left mandible decrease sensation will improve over time -Suture removed from incision site and new steri strip reapplied - flow cytometry with no significant findings, still awaiting surgical path - will call with results -F/u 2-3 weeks

## 2024-08-26 NOTE — HISTORY OF PRESENT ILLNESS
[de-identified] : 25y/o female s/p excisional cervical lymph node biopsy 8/20/24. She is here for f/u. She states she is having some numbness along her left jaw line. She feels her right side over incision site is still swollen. She is still having neck pain. She is taking the Tylenol and finished the oxycodone, she has no difficulty swallowing. She has her sense of taste. She states she just got a cold a couple of days ago.

## 2024-08-26 NOTE — PHYSICAL EXAM
[de-identified] : incision site clean dry and intact [Normal] : cranial nerves 2-12 intact [de-identified] : decreased sensation along left mandible

## 2024-08-26 NOTE — REASON FOR VISIT
[Post-Operative Visit] : a post-operative visit [FreeTextEntry2] : s/p excisional cervical lymph node biopsy 8/20/24

## 2024-08-26 NOTE — PROCEDURE
[FreeTextEntry3] : Steri-strip removed from right neck incision site,  suture removed from incision site, steri strip reapplied

## 2024-09-06 LAB — CHROM ANALY OVERALL INTERP SPEC-IMP: SIGNIFICANT CHANGE UP

## 2024-09-16 ENCOUNTER — APPOINTMENT (OUTPATIENT)
Dept: OTOLARYNGOLOGY | Facility: CLINIC | Age: 24
End: 2024-09-16
Payer: COMMERCIAL

## 2024-09-16 VITALS
SYSTOLIC BLOOD PRESSURE: 114 MMHG | HEART RATE: 89 BPM | HEIGHT: 68 IN | BODY MASS INDEX: 23.49 KG/M2 | WEIGHT: 155 LBS | DIASTOLIC BLOOD PRESSURE: 72 MMHG | OXYGEN SATURATION: 98 %

## 2024-09-16 DIAGNOSIS — R59.0 LOCALIZED ENLARGED LYMPH NODES: ICD-10-CM

## 2024-09-16 PROCEDURE — 99212 OFFICE O/P EST SF 10 MIN: CPT

## 2024-09-16 NOTE — ASSESSMENT
[FreeTextEntry1] : 23y/o female s/p excisional cervical lymph node biopsy 8/20/24: She is doing well. Pathology showed EBV   - CN intact; mandible sensation is improving -Recommended applying Mederma or silicone scar cream a couple times a day (given handout) and advised to wear sunscreen or cover when outdoors in sun to reduce hyperpigmentation - If you notice any more enlarged lymph nodes in head/neck area please call back and let us know  - Continue to f/u with rheumatology for further workup, will reach out to Dr. Vivar to see if any need for further heme/onc eval -F/u as needed

## 2024-09-16 NOTE — HISTORY OF PRESENT ILLNESS
[de-identified] : 23y/o female s/p excisional cervical lymph node biopsy 8/20/24. She is here for f/u. She states she is having some numbness along her left jaw line. She feels her right side over incision site is still swollen. She is still having neck pain. She is taking the Tylenol and finished the oxycodone, she has no difficulty swallowing. She has her sense of taste. She states she just got a cold a couple of days ago. [FreeTextEntry1] : She is here for f/u. Her pain on the left side of her neck has improved. She no longer has numbness/tingling along her left jaw now she has been feeling a burning sensation. Her neck swelling has improved. She has no difficulty swallowing. She has her sense of taste. She states she has changed the dressing once. Dr. Mack would like to see her in 3 months to recheck. She notes that the new submental node that popped up prior to surgery and was tender is smaller and no longer TTP but is still there. No other new nodes in her neck, axilla or groin.

## 2024-09-16 NOTE — PHYSICAL EXAM
[Normal] : temporomandibular joint is normal [de-identified] : incision site clean dry and intact, healing well with mild hyperpigmentation of scar

## 2024-12-03 ENCOUNTER — APPOINTMENT (OUTPATIENT)
Dept: RHEUMATOLOGY | Facility: CLINIC | Age: 24
End: 2024-12-03
Payer: COMMERCIAL

## 2024-12-03 VITALS
HEIGHT: 68 IN | SYSTOLIC BLOOD PRESSURE: 115 MMHG | BODY MASS INDEX: 22.73 KG/M2 | RESPIRATION RATE: 18 BRPM | WEIGHT: 150 LBS | DIASTOLIC BLOOD PRESSURE: 72 MMHG | OXYGEN SATURATION: 99 % | HEART RATE: 72 BPM

## 2024-12-03 DIAGNOSIS — R59.0 LOCALIZED ENLARGED LYMPH NODES: ICD-10-CM

## 2024-12-03 PROCEDURE — 99213 OFFICE O/P EST LOW 20 MIN: CPT

## 2024-12-11 LAB
ALBUMIN MFR SERPL ELPH: 58.8 %
ALBUMIN SERPL ELPH-MCNC: 4.5 G/DL
ALBUMIN SERPL-MCNC: 4.3 G/DL
ALBUMIN/GLOB SERPL: 1.4 RATIO
ALP BLD-CCNC: 50 U/L
ALPHA1 GLOB MFR SERPL ELPH: 3.3 %
ALPHA1 GLOB SERPL ELPH-MCNC: 0.2 G/DL
ALPHA2 GLOB MFR SERPL ELPH: 8.7 %
ALPHA2 GLOB SERPL ELPH-MCNC: 0.6 G/DL
ALT SERPL-CCNC: 40 U/L
ANION GAP SERPL CALC-SCNC: 11 MMOL/L
AST SERPL-CCNC: 62 U/L
B-GLOBULIN MFR SERPL ELPH: 12 %
B-GLOBULIN SERPL ELPH-MCNC: 0.9 G/DL
BASOPHILS # BLD AUTO: 0.02 K/UL
BASOPHILS NFR BLD AUTO: 0.4 %
BILIRUB SERPL-MCNC: 0.7 MG/DL
BUN SERPL-MCNC: 10 MG/DL
C3 SERPL-MCNC: 106 MG/DL
C4 SERPL-MCNC: 22 MG/DL
CALCIUM SERPL-MCNC: 9.7 MG/DL
CHLORIDE SERPL-SCNC: 103 MMOL/L
CO2 SERPL-SCNC: 24 MMOL/L
CREAT SERPL-MCNC: 0.87 MG/DL
EGFR: 97 ML/MIN/1.73M2
EOSINOPHIL # BLD AUTO: 0.07 K/UL
EOSINOPHIL NFR BLD AUTO: 1.3 %
GAMMA GLOB FLD ELPH-MCNC: 1.3 G/DL
GAMMA GLOB MFR SERPL ELPH: 17.2 %
GLUCOSE SERPL-MCNC: 93 MG/DL
HCT VFR BLD CALC: 39.1 %
HGB BLD-MCNC: 12.6 G/DL
IMM GRANULOCYTES NFR BLD AUTO: 0.2 %
INTERPRETATION SERPL IEP-IMP: NORMAL
LYMPHOCYTES # BLD AUTO: 1.66 K/UL
LYMPHOCYTES NFR BLD AUTO: 31.9 %
M PROTEIN SPEC IFE-MCNC: NORMAL
MAN DIFF?: NORMAL
MCHC RBC-ENTMCNC: 30.1 PG
MCHC RBC-ENTMCNC: 32.2 G/DL
MCV RBC AUTO: 93.3 FL
MONOCYTES # BLD AUTO: 0.34 K/UL
MONOCYTES NFR BLD AUTO: 6.5 %
NEUTROPHILS # BLD AUTO: 3.1 K/UL
NEUTROPHILS NFR BLD AUTO: 59.7 %
PLATELET # BLD AUTO: 259 K/UL
POTASSIUM SERPL-SCNC: 4.1 MMOL/L
PROT SERPL-MCNC: 7.3 G/DL
RBC # BLD: 4.19 M/UL
RBC # FLD: 13.5 %
SODIUM SERPL-SCNC: 139 MMOL/L
WBC # FLD AUTO: 5.2 K/UL

## 2025-05-08 ENCOUNTER — APPOINTMENT (OUTPATIENT)
Dept: CT IMAGING | Facility: IMAGING CENTER | Age: 25
End: 2025-05-08

## 2025-05-08 ENCOUNTER — OUTPATIENT (OUTPATIENT)
Dept: OUTPATIENT SERVICES | Facility: HOSPITAL | Age: 25
LOS: 1 days | End: 2025-05-08
Payer: COMMERCIAL

## 2025-05-08 DIAGNOSIS — R59.0 LOCALIZED ENLARGED LYMPH NODES: ICD-10-CM

## 2025-05-08 DIAGNOSIS — Z98.890 OTHER SPECIFIED POSTPROCEDURAL STATES: Chronic | ICD-10-CM

## 2025-05-08 PROCEDURE — 70491 CT SOFT TISSUE NECK W/DYE: CPT

## 2025-05-08 PROCEDURE — 70491 CT SOFT TISSUE NECK W/DYE: CPT | Mod: 26

## 2025-07-02 ENCOUNTER — APPOINTMENT (OUTPATIENT)
Dept: RHEUMATOLOGY | Facility: CLINIC | Age: 25
End: 2025-07-02
Payer: COMMERCIAL

## 2025-07-02 VITALS
DIASTOLIC BLOOD PRESSURE: 69 MMHG | SYSTOLIC BLOOD PRESSURE: 112 MMHG | HEIGHT: 68 IN | BODY MASS INDEX: 25.01 KG/M2 | RESPIRATION RATE: 18 BRPM | HEART RATE: 57 BPM | WEIGHT: 165 LBS | OXYGEN SATURATION: 99 %

## 2025-07-02 PROCEDURE — 99213 OFFICE O/P EST LOW 20 MIN: CPT

## 2025-07-03 LAB
ALBUMIN SERPL ELPH-MCNC: 4.8 G/DL
ALP BLD-CCNC: 59 U/L
ALT SERPL-CCNC: 12 U/L
ANION GAP SERPL CALC-SCNC: 15 MMOL/L
APPEARANCE: CLEAR
AST SERPL-CCNC: 17 U/L
BACTERIA: NEGATIVE /HPF
BASOPHILS # BLD AUTO: 0.06 K/UL
BASOPHILS NFR BLD AUTO: 0.7 %
BILIRUB SERPL-MCNC: 1.1 MG/DL
BILIRUBIN URINE: NEGATIVE
BLOOD URINE: NEGATIVE
BUN SERPL-MCNC: 15 MG/DL
C3 SERPL-MCNC: 106 MG/DL
C4 SERPL-MCNC: 21 MG/DL
CALCIUM SERPL-MCNC: 9.7 MG/DL
CAST: 0 /LPF
CHLORIDE SERPL-SCNC: 101 MMOL/L
CO2 SERPL-SCNC: 22 MMOL/L
COLOR: YELLOW
CREAT SERPL-MCNC: 0.97 MG/DL
CREAT SPEC-SCNC: 49 MG/DL
CREAT/PROT UR: 0.1 RATIO
EGFRCR SERPLBLD CKD-EPI 2021: 84 ML/MIN/1.73M2
EOSINOPHIL # BLD AUTO: 0.11 K/UL
EOSINOPHIL NFR BLD AUTO: 1.2 %
EPITHELIAL CELLS: 4 /HPF
FERRITIN SERPL-MCNC: 35 NG/ML
GLUCOSE QUALITATIVE U: NEGATIVE MG/DL
GLUCOSE SERPL-MCNC: 90 MG/DL
HCT VFR BLD CALC: 41 %
HGB BLD-MCNC: 12.9 G/DL
IMM GRANULOCYTES NFR BLD AUTO: 0.2 %
KETONES URINE: NEGATIVE MG/DL
LEUKOCYTE ESTERASE URINE: ABNORMAL
LYMPHOCYTES # BLD AUTO: 2.88 K/UL
LYMPHOCYTES NFR BLD AUTO: 32.3 %
MAN DIFF?: NORMAL
MCHC RBC-ENTMCNC: 29.9 PG
MCHC RBC-ENTMCNC: 31.5 G/DL
MCV RBC AUTO: 94.9 FL
MICROSCOPIC-UA: NORMAL
MONOCYTES # BLD AUTO: 0.6 K/UL
MONOCYTES NFR BLD AUTO: 6.7 %
NEUTROPHILS # BLD AUTO: 5.26 K/UL
NEUTROPHILS NFR BLD AUTO: 58.9 %
NITRITE URINE: NEGATIVE
PH URINE: 7
PLATELET # BLD AUTO: 299 K/UL
POTASSIUM SERPL-SCNC: 4.4 MMOL/L
PROT SERPL-MCNC: 7.5 G/DL
PROT UR-MCNC: 4 MG/DL
PROTEIN URINE: NEGATIVE MG/DL
RBC # BLD: 4.32 M/UL
RBC # FLD: 13.7 %
RED BLOOD CELLS URINE: 0 /HPF
SODIUM SERPL-SCNC: 138 MMOL/L
SPECIFIC GRAVITY URINE: 1.01
UROBILINOGEN URINE: 0.2 MG/DL
WBC # FLD AUTO: 8.93 K/UL
WHITE BLOOD CELLS URINE: 0 /HPF

## 2025-07-05 LAB
ALBUMIN MFR SERPL ELPH: 60.2 %
ALBUMIN SERPL-MCNC: 4.5 G/DL
ALBUMIN/GLOB SERPL: 1.5 RATIO
ALPHA1 GLOB MFR SERPL ELPH: 3 %
ALPHA1 GLOB SERPL ELPH-MCNC: 0.2 G/DL
ALPHA2 GLOB MFR SERPL ELPH: 8.5 %
ALPHA2 GLOB SERPL ELPH-MCNC: 0.6 G/DL
B-GLOBULIN MFR SERPL ELPH: 11.8 %
B-GLOBULIN SERPL ELPH-MCNC: 0.9 G/DL
GAMMA GLOB FLD ELPH-MCNC: 1.2 G/DL
GAMMA GLOB MFR SERPL ELPH: 16.5 %
INTERPRETATION SERPL IEP-IMP: NORMAL
M PROTEIN SPEC IFE-MCNC: NORMAL
PROT SERPL-MCNC: 7.5 G/DL
PROT SERPL-MCNC: 7.5 G/DL

## 2025-07-07 LAB
ANA TITR SER: NEGATIVE
ENA RNP AB SER-ACNC: 0.3 AL
ENA SM AB SER-ACNC: <0.2 AL
ENA SS-A AB SER-ACNC: <0.2 AL
ENA SS-B AB SER-ACNC: <0.2 AL

## (undated) DEVICE — ELCTR ROCKER SWITCH PENCIL BLUE 10FT

## (undated) DEVICE — GLV 7 PROTEXIS (WHITE)

## (undated) DEVICE — ELCTR BOVIE TIP BLADE INSULATED 4" EDGE

## (undated) DEVICE — PACK MINOR NO DRAPE

## (undated) DEVICE — POSITIONER FOAM EGG CRATE ULNAR 2PCS (PINK)

## (undated) DEVICE — DRAPE SPLIT SHEET 77" X 108"

## (undated) DEVICE — CAM-ESU VALLEYLAB FORCE FX 019523: Type: DURABLE MEDICAL EQUIPMENT

## (undated) DEVICE — PREP BETADINE KIT

## (undated) DEVICE — WARMING BLANKET LOWER ADULT

## (undated) DEVICE — LABELS BLANK W PEN

## (undated) DEVICE — VENODYNE/SCD SLEEVE CALF MEDIUM

## (undated) DEVICE — WARMING BLANKET UNDERBODY PEDS 36 X 33"

## (undated) DEVICE — ELCTR GROUNDING PAD PEDS COVIDIEN

## (undated) DEVICE — SUT MONOCRYL 4-0 18" PS-2

## (undated) DEVICE — SOL IRR POUR NS 0.9% 500ML

## (undated) DEVICE — DRAPE THYROID 77" X 123"

## (undated) DEVICE — ELCTR BOVIE TIP NEEDLE INSULATED 6.5" EDGE

## (undated) DEVICE — DRSG STERISTRIPS 0.5 X 4"

## (undated) DEVICE — POSITIONER PATIENT SAFETY STRAP 3X60"

## (undated) DEVICE — GLV 7.5 PROTEXIS (WHITE)

## (undated) DEVICE — SUT ETHILON 4-0 18" PS-2

## (undated) DEVICE — ELCTR GROUNDING PAD ADULT COVIDIEN

## (undated) DEVICE — NDL HYPO REGULAR BEVEL 25G X 1.5" (BLUE)

## (undated) DEVICE — DRAPE 3/4 SHEET 52X76"

## (undated) DEVICE — DRSG MASTISOL

## (undated) DEVICE — FRAZIER SUCTION TIP 10FR

## (undated) DEVICE — SOL IRR POUR H2O 500ML

## (undated) DEVICE — SYR LUER LOK 10CC